# Patient Record
Sex: FEMALE | Race: WHITE | NOT HISPANIC OR LATINO | Employment: STUDENT | URBAN - METROPOLITAN AREA
[De-identification: names, ages, dates, MRNs, and addresses within clinical notes are randomized per-mention and may not be internally consistent; named-entity substitution may affect disease eponyms.]

---

## 2017-03-30 ENCOUNTER — GENERIC CONVERSION - ENCOUNTER (OUTPATIENT)
Dept: OTHER | Facility: OTHER | Age: 22
End: 2017-03-30

## 2017-06-29 ENCOUNTER — ALLSCRIPTS OFFICE VISIT (OUTPATIENT)
Dept: OTHER | Facility: OTHER | Age: 22
End: 2017-06-29

## 2017-11-25 ENCOUNTER — APPOINTMENT (OUTPATIENT)
Dept: URGENT CARE | Age: 22
End: 2017-11-25
Payer: COMMERCIAL

## 2017-11-25 PROCEDURE — G0008 ADMIN INFLUENZA VIRUS VAC: HCPCS

## 2017-12-27 ENCOUNTER — ALLSCRIPTS OFFICE VISIT (OUTPATIENT)
Dept: OTHER | Facility: OTHER | Age: 22
End: 2017-12-27

## 2017-12-28 NOTE — PROGRESS NOTES
Assessment   1  Encounter for routine gynecological examination (V72 31) (Z01 419)    Plan   Contraception management    · Renew: Junel FE 1/20 1-20 MG-MCG Oral Tablet; take 1 tablet by mouth daily  Rx By: Jeremyn Mode; Dispense: 84 Days ; #:84 Tablet; Refill: 3;For: Contraception     management; INDIO = N; Sent To: [facility]  Encounter for routine gynecological examination    · Stop: Loestrin Fe 1/20 1-20 MG-MCG Oral Tablet (Norethin Ace-Eth Estrad-FE)  Rx By: Oswaldbern Mode; Dispense: 28 Days ; #:84 TABS; Refill: 3;For: Encounter for     routine gynecological examination; INDIO = N; Record    Discussion/Summary      1  Pap smear deferred due to low risk status, last Pap 2016 within normal limits Encouraged self-breast examination as well as calcium supplementation Continue Loestrin FE 1/20 x1 year Return to office in 1 year  The patient has the current Goals: Continue preventative screening  The patent has the current Barriers: No barriers  Chief Complaint   annual visit      History of Present Illness   HPI: This is a 79-year-old female G0 who presents for her annual gyn exam  She has been on Loestrin 1/20 for the last year  Her cycles have significantly improved, now every 28 days lasting 4 days with no breakthrough bleeding  She denies any changes in bowel or bladder function  She is sexually active and has been in a monogamous relationship for close to 3 years  She has recently relocated to [place] in October 2017  She is employed now as a ENT physician assistant  She states her boyfriend will be moving out in the next couple of months  last Pap smear was 2016 within normal limits  All her Pap smears have been normal  She did receive the Gardasil vaccine at age 12  Review of Systems        Cardiovascular: no complaints of slow or fast heart rate, no chest pain, no palpitations, no leg claudication or lower extremity edema        Respiratory: no complaints of shortness of breath, no wheezing, no dyspnea on exertion, no orthopnea or PND  Breasts: no complaints of breast pain, breast lump or nipple discharge  Gastrointestinal: no complaints of abdominal pain, no constipation, no nausea or diarrhea, no vomiting, no bloody stools  Genitourinary: no complaints of dysuria, no incontinence, no pelvic pain, no dysmenorrhea, no vaginal discharge or abnormal vaginal bleeding  Over the past 2 weeks, how often have you been bothered by the following problems? 1 ) Little interest or pleasure in doing things? Not at all       2 ) Feeling down, depressed or hopeless? Not at all       3 ) Trouble falling asleep or sleeping too much? Several days  4 ) Feeling tired or having little energy? Not at all       5 ) Poor appetite or overeating? Not at all       6 ) Feeling bad about yourself, or that you are a failure, or have let yourself or your family down? Not at all       7 ) Trouble concentrating on things, such as reading a newspaper or watching television? Not at all       8 ) Moving or speaking so slowly that other people could have noticed, or the opposite, moving or speaking faster than usual? Not at all  How difficult have these problems made it for you to do your work, take care of things at home, or get along with people? Not at all  Score 1       ROS reviewed  OB History   Pregnancy History (Brief):      Prior pregnancies: : 0  Para: Active Problems   1  Contraception management (5 9) (Z30 9)  2  Encounter for PPD test (4 1) (Z11 1)  3  Encounter for routine gynecological examination ( 31) (Z01 419)  4  Immunity status testing ( 61) (Z01 84)  5  Well adolescent visit (V20 2) (Z00 129)    Past Medical History    · History of adjustment disorder (V12 40) (Z86 69)     The active problems and past medical history were reviewed and updated today  Surgical History      The surgical history was reviewed and updated today         Family History   Mother    · Family history of Irregular heartbeat  Father    · Family history of Ataxia   · Family history of Atypical chest pain   · Family history of coronary artery disease (V17 3) (Z82 49)   · Family history of High blood cholesterol level   · Family history of Prostate disease     The family history was reviewed and updated today  Social History    · Alcohol use (V49 89) (Z78 9)   · Denied: History of Being A Social Drinker   · Denied: History of Drug Use   · Never A Smoker  The social history was reviewed and updated today  Current Meds   1  Junel FE 1/20 1-20 MG-MCG Oral Tablet; take 1 tablet by mouth daily; Therapy: [id number] to (IAQBIPME:[id number])  Requested for: 31Oct2017; Last     Rx:31Oct2017 Ordered  2  Loestrin Fe 1/20 1-20 MG-MCG Oral Tablet; Take 1 tablet daily; Therapy: [id number] to [phone])  Requested for: [id number] Recorded    Allergies   1  No Known Drug Allergies    Vitals    Recorded: 90OKC0880 96:11CP   Systolic 722   Diastolic 72   Height 5 ft 10 in   Weight 152 lb    BMI Calculated 21 81   BSA Calculated 1 86   LMP [id number]     Physical Exam        Constitutional      General appearance: No acute distress, well appearing and well nourished  Pulmonary      Auscultation of lungs: Clear to auscultation  Cardiovascular      Auscultation of heart: Normal rate and rhythm, normal S1 and S2, no murmurs  Genitourinary      External genitalia: Normal and no lesions appreciated  Vagina: Normal, no lesions or dryness appreciated  Urethral meatus: Normal        Cervix: Normal, no palpable masses  Uterus: Normal, non-tender, not enlarged, and no palpable masses  Adnexa/parametria: Normal, non-tender and no fullness or masses appreciated  Chest      Breasts: Normal and no dimpling or skin changes noted  Abdomen      Abdomen: Normal, non-tender, and no organomegaly noted         Signatures    Electronically signed by : Laureano Dumont DO; Dec 27 2017 10:01AM EST                       (Author)

## 2018-01-13 VITALS
SYSTOLIC BLOOD PRESSURE: 110 MMHG | BODY MASS INDEX: 21.47 KG/M2 | RESPIRATION RATE: 14 BRPM | HEART RATE: 68 BPM | DIASTOLIC BLOOD PRESSURE: 78 MMHG | HEIGHT: 70 IN | WEIGHT: 150 LBS

## 2018-01-23 VITALS
DIASTOLIC BLOOD PRESSURE: 72 MMHG | BODY MASS INDEX: 21.76 KG/M2 | WEIGHT: 152 LBS | HEIGHT: 70 IN | SYSTOLIC BLOOD PRESSURE: 112 MMHG

## 2018-12-26 ENCOUNTER — ANNUAL EXAM (OUTPATIENT)
Dept: OBGYN CLINIC | Facility: CLINIC | Age: 23
End: 2018-12-26
Payer: COMMERCIAL

## 2018-12-26 VITALS
HEIGHT: 70 IN | BODY MASS INDEX: 21.5 KG/M2 | WEIGHT: 150.2 LBS | DIASTOLIC BLOOD PRESSURE: 72 MMHG | SYSTOLIC BLOOD PRESSURE: 118 MMHG

## 2018-12-26 DIAGNOSIS — Z30.41 ENCOUNTER FOR SURVEILLANCE OF CONTRACEPTIVE PILLS: ICD-10-CM

## 2018-12-26 DIAGNOSIS — Z01.419 ENCOUNTER FOR ANNUAL ROUTINE GYNECOLOGICAL EXAMINATION: Primary | ICD-10-CM

## 2018-12-26 PROCEDURE — G0145 SCR C/V CYTO,THINLAYER,RESCR: HCPCS | Performed by: OBSTETRICS & GYNECOLOGY

## 2018-12-26 PROCEDURE — 99395 PREV VISIT EST AGE 18-39: CPT | Performed by: OBSTETRICS & GYNECOLOGY

## 2018-12-26 RX ORDER — NORETHINDRONE ACETATE AND ETHINYL ESTRADIOL 1MG-20(21)
KIT ORAL
COMMUNITY
Start: 2018-10-04 | End: 2018-12-26 | Stop reason: SDUPTHER

## 2018-12-26 RX ORDER — NORETHINDRONE ACETATE AND ETHINYL ESTRADIOL 1MG-20(21)
1 KIT ORAL DAILY
Qty: 84 TABLET | Refills: 3 | Status: SHIPPED | OUTPATIENT
Start: 2018-12-26 | End: 2019-11-07 | Stop reason: SDUPTHER

## 2018-12-26 NOTE — PROGRESS NOTES
Assessment/Plan:    Pap smear done as well as annual   Encouraged self-breast examination as well as calcium supplementation  Discussed follow up biopsy for definitive diagnosis  She does admit to irritation intermittently  She will consider this  In the meantime she would like to use hydrocortisone cream p r n     Continue Loestrin 1/20 x1 year  Return to office in 1 year or p r n  No problem-specific Assessment & Plan notes found for this encounter  Diagnoses and all orders for this visit:    Encounter for annual routine gynecological examination  -     Liquid-based pap, screening    Encounter for surveillance of contraceptive pills  -     norethindrone-ethinyl estradiol (JUNEL FE 1/20) 1-20 MG-MCG per tablet; Take 1 tablet by mouth daily    Other orders  -     Discontinue: norethindrone-ethinyl estradiol (JUNEL FE 1/20) 1-20 MG-MCG per tablet;           Subjective:      Patient ID: Saima Berry is a 24 y o  female  HPI     This is a 25-year-old female G0 who presents for annual gyn exam   She has now been on Loestrin 1/20 for the last 2 years  Her menstrual cycles are regular every 4 weeks lasting 3-4 days with no breakthrough bleeding  She denies any changes in bowel or bladder function  She is sexually active and has been in a monogamous relationship for close to 4 years  She did relocate last year to [place]  She is employed now as a physician assistant, ENT  Her boyfriend has also relocated there are now living together for close to 1 year  Patient did receive the Gardasil vaccine at age 12  The following portions of the patient's history were reviewed and updated as appropriate: allergies, current medications, past family history, past medical history, past social history, past surgical history and problem list     Review of Systems   Constitutional: Negative for fatigue, fever and unexpected weight change     Respiratory: Negative for cough, chest tightness, shortness of breath and wheezing  Cardiovascular: Negative  Negative for chest pain and palpitations  Gastrointestinal: Negative  Negative for abdominal distention, abdominal pain, blood in stool, constipation, diarrhea, nausea and vomiting  Genitourinary: Negative  Negative for difficulty urinating, dyspareunia, dysuria, flank pain, frequency, genital sores, hematuria, pelvic pain, urgency, vaginal bleeding, vaginal discharge and vaginal pain  Skin: Negative for rash  Objective:      /72   Ht 5' 9 5" (1 765 m)   Wt 68 1 kg (150 lb 3 2 oz)   LMP 12/12/2018 (Approximate)   Breastfeeding? No   BMI 21 86 kg/m²          Physical Exam   Constitutional: She appears well-developed and well-nourished  Cardiovascular: Normal rate and regular rhythm  Pulmonary/Chest: Effort normal and breath sounds normal  Right breast exhibits no inverted nipple, no mass, no nipple discharge, no skin change and no tenderness  Left breast exhibits no inverted nipple, no mass, no nipple discharge, no skin change and no tenderness  Abdominal: Soft  Bowel sounds are normal  She exhibits no distension  There is no tenderness  There is no rebound and no guarding  Genitourinary: Vagina normal and uterus normal  There is no lesion on the right labia  There is no lesion on the left labia  Cervix exhibits no discharge and no friability  Right adnexum displays no mass, no tenderness and no fullness  Left adnexum displays no mass, no tenderness and no fullness  No vaginal discharge found  Genitourinary Comments: External genitalia is significant for erythema, fissure along inferior labia minora, posterior fourchette, suspicious lichen sclerosis

## 2018-12-28 LAB
LAB AP GYN PRIMARY INTERPRETATION: NORMAL
LAB AP LMP: NORMAL
Lab: NORMAL

## 2019-11-07 DIAGNOSIS — Z30.41 ENCOUNTER FOR SURVEILLANCE OF CONTRACEPTIVE PILLS: ICD-10-CM

## 2019-11-07 RX ORDER — NORETHINDRONE ACETATE AND ETHINYL ESTRADIOL AND FERROUS FUMARATE 1MG-20(21)
KIT ORAL
Qty: 84 TABLET | Refills: 0 | Status: SHIPPED | OUTPATIENT
Start: 2019-11-07